# Patient Record
Sex: FEMALE | Race: WHITE | Employment: UNEMPLOYED | URBAN - METROPOLITAN AREA
[De-identification: names, ages, dates, MRNs, and addresses within clinical notes are randomized per-mention and may not be internally consistent; named-entity substitution may affect disease eponyms.]

---

## 2017-01-03 ENCOUNTER — HOSPITAL ENCOUNTER (OUTPATIENT)
Dept: LAB | Age: 22
Discharge: HOME | End: 2017-01-03
Payer: COMMERCIAL

## 2017-01-03 ENCOUNTER — OFFICE VISIT (OUTPATIENT)
Dept: FAMILY MEDICINE CLINIC | Age: 22
End: 2017-01-03

## 2017-01-03 VITALS
HEIGHT: 68 IN | RESPIRATION RATE: 18 BRPM | BODY MASS INDEX: 23.95 KG/M2 | DIASTOLIC BLOOD PRESSURE: 77 MMHG | SYSTOLIC BLOOD PRESSURE: 112 MMHG | TEMPERATURE: 97.7 F | HEART RATE: 82 BPM | WEIGHT: 158 LBS | OXYGEN SATURATION: 98 %

## 2017-01-03 DIAGNOSIS — Z83.49 FH: HEMOCHROMATOSIS: ICD-10-CM

## 2017-01-03 DIAGNOSIS — L71.9 ROSACEA: ICD-10-CM

## 2017-01-03 DIAGNOSIS — Z83.79 FAMILY HISTORY OF ULCERATIVE COLITIS: ICD-10-CM

## 2017-01-03 DIAGNOSIS — R19.7 DIARRHEA, UNSPECIFIED TYPE: ICD-10-CM

## 2017-01-03 DIAGNOSIS — R53.82 CHRONIC FATIGUE: ICD-10-CM

## 2017-01-03 DIAGNOSIS — R10.84 GENERALIZED ABDOMINAL PAIN: Primary | ICD-10-CM

## 2017-01-03 DIAGNOSIS — R10.84 GENERALIZED ABDOMINAL PAIN: ICD-10-CM

## 2017-01-03 LAB
ALBUMIN SERPL BCP-MCNC: 4.6 G/DL (ref 3.4–5)
ALBUMIN/GLOB SERPL: 1.4 {RATIO} (ref 0.8–1.7)
ALP SERPL-CCNC: 65 U/L (ref 45–117)
ALT SERPL-CCNC: 19 U/L (ref 13–56)
ANION GAP BLD CALC-SCNC: 10 MMOL/L (ref 3–18)
AST SERPL W P-5'-P-CCNC: 9 U/L (ref 15–37)
BASOPHILS # BLD AUTO: 0 K/UL (ref 0–0.06)
BASOPHILS # BLD: 0 % (ref 0–2)
BILIRUB SERPL-MCNC: 0.3 MG/DL (ref 0.2–1)
BUN SERPL-MCNC: 8 MG/DL (ref 7–18)
BUN/CREAT SERPL: 14 (ref 12–20)
CALCIUM SERPL-MCNC: 9.4 MG/DL (ref 8.5–10.1)
CHLORIDE SERPL-SCNC: 104 MMOL/L (ref 100–108)
CO2 SERPL-SCNC: 24 MMOL/L (ref 21–32)
CREAT SERPL-MCNC: 0.59 MG/DL (ref 0.6–1.3)
CRP SERPL-MCNC: <0.3 MG/DL (ref 0–0.3)
DIFFERENTIAL METHOD BLD: NORMAL
EOSINOPHIL # BLD: 0.1 K/UL (ref 0–0.4)
EOSINOPHIL NFR BLD: 1 % (ref 0–5)
ERYTHROCYTE [DISTWIDTH] IN BLOOD BY AUTOMATED COUNT: 14.2 % (ref 11.6–14.5)
ERYTHROCYTE [SEDIMENTATION RATE] IN BLOOD: 5 MM/HR (ref 0–20)
FERRITIN SERPL-MCNC: 30 NG/ML (ref 8–388)
GLOBULIN SER CALC-MCNC: 3.2 G/DL (ref 2–4)
GLUCOSE SERPL-MCNC: 87 MG/DL (ref 74–99)
HCT VFR BLD AUTO: 44.3 % (ref 35–45)
HGB BLD-MCNC: 13.9 G/DL (ref 12–16)
IRON SATN MFR SERPL: 24 %
IRON SERPL-MCNC: 74 UG/DL (ref 50–175)
LYMPHOCYTES # BLD AUTO: 39 % (ref 21–52)
LYMPHOCYTES # BLD: 2.6 K/UL (ref 0.9–3.6)
MCH RBC QN AUTO: 27 PG (ref 24–34)
MCHC RBC AUTO-ENTMCNC: 31.4 G/DL (ref 31–37)
MCV RBC AUTO: 86.2 FL (ref 74–97)
MONOCYTES # BLD: 0.4 K/UL (ref 0.05–1.2)
MONOCYTES NFR BLD AUTO: 5 % (ref 3–10)
NEUTS SEG # BLD: 3.7 K/UL (ref 1.8–8)
NEUTS SEG NFR BLD AUTO: 55 % (ref 40–73)
PLATELET # BLD AUTO: 288 K/UL (ref 135–420)
PMV BLD AUTO: 11.7 FL (ref 9.2–11.8)
POTASSIUM SERPL-SCNC: 3.7 MMOL/L (ref 3.5–5.5)
PROT SERPL-MCNC: 7.8 G/DL (ref 6.4–8.2)
RBC # BLD AUTO: 5.14 M/UL (ref 4.2–5.3)
SODIUM SERPL-SCNC: 138 MMOL/L (ref 136–145)
T4 FREE SERPL-MCNC: 1.2 NG/DL (ref 0.7–1.5)
TIBC SERPL-MCNC: 305 UG/DL (ref 250–450)
TSH SERPL DL<=0.05 MIU/L-ACNC: 1.9 UIU/ML (ref 0.36–3.74)
WBC # BLD AUTO: 6.8 K/UL (ref 4.6–13.2)

## 2017-01-03 PROCEDURE — 86038 ANTINUCLEAR ANTIBODIES: CPT | Performed by: PHYSICIAN ASSISTANT

## 2017-01-03 PROCEDURE — 80053 COMPREHEN METABOLIC PANEL: CPT | Performed by: PHYSICIAN ASSISTANT

## 2017-01-03 PROCEDURE — 82728 ASSAY OF FERRITIN: CPT | Performed by: PHYSICIAN ASSISTANT

## 2017-01-03 PROCEDURE — 86140 C-REACTIVE PROTEIN: CPT | Performed by: PHYSICIAN ASSISTANT

## 2017-01-03 PROCEDURE — 84439 ASSAY OF FREE THYROXINE: CPT | Performed by: PHYSICIAN ASSISTANT

## 2017-01-03 PROCEDURE — 83540 ASSAY OF IRON: CPT | Performed by: PHYSICIAN ASSISTANT

## 2017-01-03 PROCEDURE — 85652 RBC SED RATE AUTOMATED: CPT | Performed by: PHYSICIAN ASSISTANT

## 2017-01-03 PROCEDURE — 85025 COMPLETE CBC W/AUTO DIFF WBC: CPT | Performed by: PHYSICIAN ASSISTANT

## 2017-01-03 PROCEDURE — 84466 ASSAY OF TRANSFERRIN: CPT | Performed by: PHYSICIAN ASSISTANT

## 2017-01-03 RX ORDER — BISMUTH SUBSALICYLATE 262 MG
1 TABLET,CHEWABLE ORAL DAILY
COMMUNITY
End: 2017-01-24

## 2017-01-03 RX ORDER — METRONIDAZOLE 10 MG/G
GEL TOPICAL
Qty: 45 G | Refills: 0 | Status: SHIPPED | OUTPATIENT
Start: 2017-01-03 | End: 2017-01-24

## 2017-01-03 NOTE — PROGRESS NOTES
HISTORY OF PRESENT ILLNESS  Karen Vega is a 25 y.o. female. HPI  Karen Vega is a 25 y.o. female who presents to the office today for abdominal pain. She is new to our practice. She comes in today with c/o abdominal pain. This started about 6 months ago. Food seems to make it worse. Has constipation and diarrhea. Sometimes will notice oily stools and mucus in her stools. Other times it is hard and dark. Waxes and wanes, does not happen every day. MGM had UC. Dad dad UC, brother and sister with no issues. Mom has diverticulosis and hemochromatosis, and Mom is worried she may have this also. She has gained about 50lbs over the last few months, but admits to eating poorly and eating a lot of fast foods. She has changed her diet lately and says she feels poorly after doing so. She has concerns that she developed an ulcer because of the amount of stress she has in life. Over the past year has developed acne. Went to dermatology and was told she has rosacea. She was prescribed a medication that began with \"z\" and this made her face worse. She found that moisturizers help. 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 She also has a stressful relationship with her boyfriend. She denies depression, but Mom thinks she may have this. She is concerned about an ulcer because of stress. [employer] student studying art. Currently On Depo for endometriosis. No issues with Depo. [facility]. Chief Complaint   Patient presents with    Abdominal Pain       Current Outpatient Prescriptions on File Prior to Visit   Medication Sig Dispense Refill    medroxyPROGESTERone (DEPO-PROVERA) 150 mg/mL injection 150 mg by IntraMUSCular route once. No current facility-administered medications on file prior to visit.       No Known Allergies  Past Medical History   Diagnosis Date    Chronic UTI     Kidney stone     Kidney stones     UTI (lower urinary tract infection)      History   Smoking Status    Never Smoker   Smokeless Tobacco    Never Used     History   Alcohol Use    1.2 oz/week    2 Glasses of wine per week     Family History   Problem Relation Age of Onset    Diabetes Mother     Hypertension Father     Migraines Father     Arthritis-osteo Paternal Grandmother     Cancer Paternal Grandmother     Heart Disease Paternal Grandmother     Lung Disease Paternal Grandmother     Stroke Paternal Grandmother     Hypertension Other        Review of Systems   Constitutional: Positive for malaise/fatigue. Negative for chills, fever and weight loss. Weight gain- 50lbs over 6 months   HENT: Negative for sore throat. Eyes: Negative for blurred vision and double vision. Respiratory: Negative for cough and sputum production. Cardiovascular: Negative for chest pain, palpitations and leg swelling. Gastrointestinal: Positive for abdominal pain, constipation and diarrhea. Negative for blood in stool, heartburn, melena, nausea and vomiting. Genitourinary: Negative for dysuria, hematuria and urgency. Musculoskeletal: Negative for joint pain and myalgias. Skin: Positive for rash. Negative for itching. Neurological: Negative for dizziness, focal weakness, seizures and weakness. Endo/Heme/Allergies: Negative for polydipsia. Does not bruise/bleed easily. Psychiatric/Behavioral: Negative for depression, substance abuse and suicidal ideas. The patient is nervous/anxious. Visit Vitals    /77 (BP 1 Location: Left arm, BP Patient Position: Sitting)    Pulse 82    Temp 97.7 °F (36.5 °C) (Oral)    Resp 18    Ht 5' 7.72\" (1.72 m)    Wt 158 lb (71.7 kg)    LMP Comment: Depo    SpO2 98%    BMI 24.23 kg/m2       Physical Exam   Constitutional: She is oriented to person, place, and time. She appears well-developed and well-nourished. No distress. Eyes: Conjunctivae are normal. Pupils are equal, round, and reactive to light. No scleral icterus. Neck: Neck supple.  No thyromegaly present. Cardiovascular: Normal rate, regular rhythm and normal heart sounds. No murmur heard. Pulmonary/Chest: Effort normal and breath sounds normal. No respiratory distress. She has no wheezes. She has no rales. Abdominal: Soft. Normal appearance and bowel sounds are normal. She exhibits no distension and no mass. There is no hepatosplenomegaly. There is generalized tenderness. There is no rigidity, no rebound, no guarding and no CVA tenderness. Musculoskeletal: She exhibits no edema. Lymphadenopathy:     She has no cervical adenopathy. Neurological: She is alert and oriented to person, place, and time. Gait normal.   Skin: Skin is warm, dry and intact. Multiple tattoos   Psychiatric: She has a normal mood and affect. Her behavior is normal. Judgment and thought content normal.   Nursing note and vitals reviewed. ASSESSMENT and PLAN    ICD-10-CM ICD-9-CM    1. Generalized abdominal pain R10.84 789.07 XR ABD ACUTE W 1 V CHEST      CBC WITH AUTOMATED DIFF      METABOLIC PANEL, COMPREHENSIVE      TSH AND FREE T4      IRON PROFILE      FERRITIN      TRANSFERRIN      C REACTIVE PROTEIN, QT      ANTINUCLEAR ANTIBODIES, IFA      SED RATE (ESR)      REFERRAL TO GASTROENTEROLOGY   2. Chronic fatigue R53.82 780.79 CBC WITH AUTOMATED DIFF      METABOLIC PANEL, COMPREHENSIVE      TSH AND FREE T4      IRON PROFILE      FERRITIN      TRANSFERRIN      C REACTIVE PROTEIN, QT      ANTINUCLEAR ANTIBODIES, IFA      SED RATE (ESR)   3. Rosacea L71.9 695.3 CBC WITH AUTOMATED DIFF      METABOLIC PANEL, COMPREHENSIVE      TSH AND FREE T4      IRON PROFILE      FERRITIN      TRANSFERRIN      C REACTIVE PROTEIN, QT      ANTINUCLEAR ANTIBODIES, IFA      SED RATE (ESR)      metroNIDAZOLE (METROGEL) 1 % topical gel   4.  Diarrhea, unspecified type R19.7 787.91 XR ABD ACUTE W 1 V CHEST      CBC WITH AUTOMATED DIFF      METABOLIC PANEL, COMPREHENSIVE      TSH AND FREE T4      IRON PROFILE      FERRITIN TRANSFERRIN      C REACTIVE PROTEIN, QT      ANTINUCLEAR ANTIBODIES, IFA      SED RATE (ESR)      REFERRAL TO GASTROENTEROLOGY   5. FH: hemochromatosis Z83.49 V18.19 XR ABD ACUTE W 1 V CHEST      CBC WITH AUTOMATED DIFF      METABOLIC PANEL, COMPREHENSIVE      TSH AND FREE T4      IRON PROFILE      FERRITIN      TRANSFERRIN      C REACTIVE PROTEIN, QT      ANTINUCLEAR ANTIBODIES, IFA      SED RATE (ESR)      REFERRAL TO GASTROENTEROLOGY   6. Family history of ulcerative colitis Z83.79 V18.59 REFERRAL TO GASTROENTEROLOGY      Will check labs, abdominal Xray and refer to gastroenterology for further testing given FH of UC. May also consider IBS. I discussed that if all tests come back normal, will need to consider depression and/or anxiety as a cause. Checking baseline labs for hemachromatosis. If hepatic panel or iron panel are abnormal, will refer for further evaluation. Will treat rash as Rosacea as previously diagnosed by Derm. F/U in 1 month. Reviewed medication and side effects. Patient agrees with the plan and verbalizes understanding. Follow-up Disposition:  Return in about 1 month (around 2/3/2017) for abdominal pain, labs.     Ranulfo James PA-C  1/3/2017

## 2017-01-03 NOTE — PROGRESS NOTES
Karen Vega is a 25 y.o. female here today with c/o abdominal pain that started several months ago. Got sick a couple of weeks ago with sinus issues and her stomach got worse. Patient states she has nausea, diarrhea, bloating, cramps, and abdominal gas. Has taken Zantac and Probiotic and neither seem to be working. States she has gained 50 lbs in 2-3 months. Does have slight decrease in appetite. Has been on Depo for 4 years. Learning assessment completed. Do you have an Advanced Directive? no    Would you like information on Advanced Directives?  yes

## 2017-01-04 LAB
ANA TITR SER IF: NEGATIVE {TITER}
TRANSFERRIN SERPL-MCNC: 237 MG/DL (ref 200–370)

## 2017-01-05 NOTE — PATIENT INSTRUCTIONS
Rosacea: Care Instructions  Your Care Instructions  Rosacea (say \"Myla\") is a skin condition that can cause redness, pimples, and red lines on the nose, cheeks, chin, and forehead. It is often mistaken for acne because it can cause outbreaks with bumps like pimples. Rosacea can also cause burning and soreness in your eyes. Rosacea is usually controlled by using medicine and avoiding alcohol, the sun, and other things that can make rosacea worse. Your doctor may have prescribed medicines or other treatment. If antibiotics do not control the rosacea, your doctor may try other medicines. Follow-up care is a key part of your treatment and safety. Be sure to make and go to all appointments, and call your doctor if you are having problems. It's also a good idea to know your test results and keep a list of the medicines you take. How can you care for yourself at home? · Take your medicines exactly as prescribed. Call your doctor if you think you are having a problem with your medicine. · If your doctor prescribed antibiotics, take them as directed. Do not stop taking them just because you feel better. You need to take the full course of antibiotics. · Always wear sunscreen on exposed skin. Make sure to use a broad-spectrum sunscreen that has a sun protection factor (SPF) of 30 or higher. Use it every day, even when it is cloudy. · Use soaps, lotions, and makeup made for sensitive skin that do not contain alcohol, are not abrasive, and will not clog pores. · Avoid rubbing or scrubbing your face. · Green-based makeup may help mask the redness of an outbreak. Your doctor may be able to refer you to someone who can help you use makeup to cover redness and bumps. · If you have rosacea on your eyelids, put a warm, wet towel, or compress, on your eyes several times a day. Gently wash your eyelids with a washcloth or an eyelid cleanser that is sold in [employer].  Use artificial tears if your eyes feel dry.  · Make a list or keep a diary of things that may trigger your rosacea. Use the diary every day for several weeks. Avoid whatever you find that makes your rosacea worse. These triggers may include:  ¨ Harsh weather. Wear a hat and scarf to shield your face from the cold and wind. Use a moisturizer during the winter to keep your face moist.  ¨ Stress. Eat a healthy diet and get plenty of exercise and sleep. ¨ Alcohol, spicy foods, or hot drinks. Avoid or limit these if they make your rosacea worse. ¨ Getting too hot when you exercise. Try working out for a shorter time. In the summer, exercise during the cool morning hours. ¨ Hot showers. Take warm or cool showers and avoid hot tubs and saunas. When should you call for help? Watch closely for changes in your health, and be sure to contact your doctor if:  · Your rosacea seems worse--more red or irritated. · Your rosacea is not better after treatment. Where can you learn more? Go to http://vanna-daniel.info/. Enter V361 in the search box to learn more about \"Rosacea: Care Instructions. \"  Current as of: February 5, 2016  Content Version: 11.1  © 1584-9134 [employer]. Care instructions adapted under license by [employer] (which disclaims liability or warranty for this information). If you have questions about a medical condition or this instruction, always ask your healthcare professional. [place] [address] any warranty or liability for your use of this information. Diarrhea: Care Instructions  Your Care Instructions    Diarrhea is loose, watery stools (bowel movements). The exact cause is often hard to find. Sometimes diarrhea is your body's way of getting rid of what caused an upset stomach. Viruses, food poisoning, and many medicines can cause diarrhea. Some people get diarrhea in response to emotional stress, anxiety, or certain foods. Almost everyone has diarrhea now and then.  It usually isn't serious, and your stools will return to normal soon. The important thing to do is replace the fluids you have lost, so you can prevent dehydration. The doctor has checked you carefully, but problems can develop later. If you notice any problems or new symptoms, get medical treatment right away. Follow-up care is a key part of your treatment and safety. Be sure to make and go to all appointments, and call your doctor if you are having problems. It's also a good idea to know your test results and keep a list of the medicines you take. How can you care for yourself at home? · Watch for signs of dehydration, which means your body has lost too much water. Dehydration is a serious condition and should be treated right away. Signs of dehydration are:  ¨ Increasing thirst and dry eyes and mouth. ¨ Feeling faint or lightheaded. ¨ Darker urine, and a smaller amount of urine than normal.  · To prevent dehydration, drink plenty of fluids, enough so that your urine is light yellow or clear like water. Choose water and other caffeine-free clear liquids until you feel better. If you have kidney, heart, or liver disease and have to limit fluids, talk with your doctor before you increase the amount of fluids you drink. · Begin eating small amounts of mild foods the next day, if you feel like it. ¨ Try yogurt that has live cultures of Lactobacillus. (Check the label.)  ¨ Avoid spicy foods, fruits, alcohol, and caffeine until 48 hours after all symptoms are gone. ¨ Avoid chewing gum that contains sorbitol. ¨ Avoid dairy products (except for yogurt with Lactobacillus) while you have diarrhea and for 3 days after symptoms are gone. · The doctor may recommend that you take over-the-counter medicine, such as loperamide (Imodium), if you still have diarrhea after 6 hours. Read and follow all instructions on the label.  Do not use this medicine if you have bloody diarrhea, a high fever, or other signs of serious illness. Call your doctor if you think you are having a problem with your medicine. When should you call for help? Call 911 anytime you think you may need emergency care. For example, call if:  · You passed out (lost consciousness). · Your stools are maroon or very bloody. Call your doctor now or seek immediate medical care if:  · You are dizzy or lightheaded, or you feel like you may faint. · Your stools are black and look like tar, or they have streaks of blood. · You have new or worse belly pain. · You have symptoms of dehydration, such as:  ¨ Dry eyes and a dry mouth. ¨ Passing only a little dark urine. ¨ Feeling thirstier than usual.  · You have a new or higher fever. Watch closely for changes in your health, and be sure to contact your doctor if:  · Your diarrhea is getting worse. · You see pus in the diarrhea. · You are not getting better after 2 days (48 hours). Where can you learn more? Go to http://vanna-daniel.info/. Enter Y205 in the search box to learn more about \"Diarrhea: Care Instructions. \"  Current as of: May 27, 2016  Content Version: 11.1  © 0857-0089 [employer]. Care instructions adapted under license by [employer] (which disclaims liability or warranty for this information). If you have questions about a medical condition or this instruction, always ask your healthcare professional. [place] [address] any warranty or liability for your use of this information.

## 2017-01-10 ENCOUNTER — TELEPHONE (OUTPATIENT)
Dept: FAMILY MEDICINE CLINIC | Age: 22
End: 2017-01-10

## 2017-01-24 ENCOUNTER — OFFICE VISIT (OUTPATIENT)
Dept: FAMILY MEDICINE CLINIC | Age: 22
End: 2017-01-24

## 2017-01-24 VITALS
DIASTOLIC BLOOD PRESSURE: 74 MMHG | HEART RATE: 67 BPM | OXYGEN SATURATION: 96 % | WEIGHT: 160 LBS | RESPIRATION RATE: 18 BRPM | TEMPERATURE: 97.8 F | HEIGHT: 68 IN | SYSTOLIC BLOOD PRESSURE: 110 MMHG | BODY MASS INDEX: 24.25 KG/M2

## 2017-01-24 DIAGNOSIS — N64.52 BILATERAL NIPPLE DISCHARGE: ICD-10-CM

## 2017-01-24 DIAGNOSIS — N64.4 BREAST TENDERNESS IN FEMALE: Primary | ICD-10-CM

## 2017-01-24 DIAGNOSIS — Z83.49 FH: HEMOCHROMATOSIS: ICD-10-CM

## 2017-01-24 DIAGNOSIS — R19.7 DIARRHEA, UNSPECIFIED TYPE: ICD-10-CM

## 2017-01-24 DIAGNOSIS — L70.9 ACNE, UNSPECIFIED ACNE TYPE: ICD-10-CM

## 2017-01-24 DIAGNOSIS — F43.9 STRESS: ICD-10-CM

## 2017-01-24 LAB
HCG URINE, QL. (POC): NEGATIVE
VALID INTERNAL CONTROL?: YES

## 2017-01-24 RX ORDER — DICYCLOMINE HYDROCHLORIDE 10 MG/1
10 CAPSULE ORAL 3 TIMES DAILY
COMMUNITY
End: 2017-01-24

## 2017-01-24 RX ORDER — LOPERAMIDE HCL 2 MG
2 TABLET ORAL
COMMUNITY
End: 2017-01-24

## 2017-01-24 NOTE — PROGRESS NOTES
Ericka Genao is a 25 y.o. female here today for a follow up on her abdominal pain. Has a follow up with gastro tomorrow. Patient is here today because she would like to discuss a new med. Learning assessment previously completed. 1. Have you been to the ER, urgent care clinic or hospitalized since your last visit? no     2. Have you seen or consulted any other health care providers outside of the [address] [place] [address] since your last visit (Include any pap smears or colon screening)? [facility]     Do you have an Advanced Directive? no    Would you like information on Advanced Directives?  no

## 2017-01-24 NOTE — PROGRESS NOTES
HISTORY OF PRESENT ILLNESS  Ellen Guerra is a 25 y.o. female. HPI  Ellen Guerra is a 25 y.o. female who presents to the office today for stress. She comes in today to follow up on discussion about stress. Her mother thinks she needs to start medication for anxiety, but Davina Robbins does not think she is anxious. She says she is just under a lot of stress over the past one year. She is a full time student, lives with her boyfriend and has a cat. She says she does not have a lot of \"me\" time. She denies depressive symptoms. She may get a panic attack no more than once a month, where she feels extra stressed with SOB. But denies palpitations, vision changes, dizziness. There is no suicidal or homicidal T/I, hallucinations, syncope. Financially she feels okay, her relationship with her boyfriend is good and he is supportive and helps with her stress. She just started diffusing essential oils to see if this helps. Declines counseling/therapy because \"it didn't work before\". \"I do not have anxiety\". She went to GYN 2 weeks ago for a PAP and US for Lower abdominal pain. She said the workup was negative. She was changed from Depo to the birth control patch, which was started last week. Yesterday she started experiencing breast pain and she was able to express discharge from her nipples. This was milky, denies blood. She is sexually active. Went to GI and was started on meds for diarrhea and abdominal cramping and pain. She is going back tomorrow for follow up. The diarrhea is much better, but she is still having pain. Reviewed labs that were checked at last visit. All results were normal, and initial hemochromatosis evaluation was negative as well. Chief Complaint   Patient presents with    Abdominal Pain       No current outpatient prescriptions on file prior to visit. No current facility-administered medications on file prior to visit.       No Known Allergies  Past Medical History   Diagnosis Date    Chronic UTI     Kidney stone     Kidney stones     UTI (lower urinary tract infection)      History   Smoking Status    Never Smoker   Smokeless Tobacco    Never Used     History   Alcohol Use    1.2 oz/week    2 Glasses of wine per week     Family History   Problem Relation Age of Onset    Diabetes Mother     Hypertension Father     Migraines Father     Arthritis-osteo Paternal Grandmother     Cancer Paternal Grandmother     Heart Disease Paternal Grandmother     Lung Disease Paternal Grandmother     Stroke Paternal Grandmother     Hypertension Other        Review of Systems   Constitutional: Positive for malaise/fatigue. Negative for chills, fever and weight loss. Eyes: Negative for blurred vision and double vision. Respiratory: Negative for cough and sputum production. Cardiovascular: Negative for chest pain, palpitations and leg swelling. Gastrointestinal: Positive for abdominal pain and diarrhea. Negative for blood in stool, constipation, nausea and vomiting. Musculoskeletal: Negative for joint pain and myalgias. Skin: Negative for itching. Acne on face   Neurological: Negative for dizziness, tingling, focal weakness, seizures and weakness. Endo/Heme/Allergies: Does not bruise/bleed easily. Breast pain with milky nipple discharge   Psychiatric/Behavioral: Negative for depression, substance abuse and suicidal ideas. The patient is nervous/anxious. Visit Vitals    /74 (BP 1 Location: Left arm, BP Patient Position: Sitting)    Pulse 67    Temp 97.8 °F (36.6 °C) (Oral)    Resp 18    Ht 5' 7.72\" (1.72 m)    Wt 160 lb (72.6 kg)    SpO2 96%    BMI 24.53 kg/m2       Physical Exam   Constitutional: She is oriented to person, place, and time. She appears well-developed and well-nourished. No distress. Neck: Neck supple. No thyromegaly present. Cardiovascular: Normal rate, regular rhythm and normal heart sounds. No murmur heard.   Pulmonary/Chest: Effort normal and breath sounds normal. No respiratory distress. She has no wheezes. She has no rales. Right breast exhibits nipple discharge and tenderness. Right breast exhibits no inverted nipple and no skin change. Left breast exhibits tenderness. Left breast exhibits no inverted nipple, no nipple discharge and no skin change. Abdominal: Soft. Normal appearance and bowel sounds are normal. She exhibits no distension and no mass. There is no hepatosplenomegaly. There is generalized tenderness. There is no rigidity, no rebound, no guarding and no CVA tenderness. Musculoskeletal: She exhibits no edema. Lymphadenopathy:     She has no cervical adenopathy. She has no axillary adenopathy. Neurological: She is alert and oriented to person, place, and time. Gait normal.   Skin: Skin is warm, dry and intact. Multiple tattoos, acne on face with few cystic lesions   Psychiatric: She has a normal mood and affect. Her behavior is normal. Judgment and thought content normal.   Nursing note and vitals reviewed. ASSESSMENT and PLAN    ICD-10-CM ICD-9-CM    1. Breast tenderness in female N64.4 611.71 AMB POC URINE PREGNANCY TEST, VISUAL COLOR COMPARISON   2. Bilateral nipple discharge N64.52 611.79 AMB POC URINE PREGNANCY TEST, VISUAL COLOR COMPARISON   3. Stress F43.9 V62.89    4. FH: hemochromatosis Z83.49 V18.19    5. Diarrhea, unspecified type R19.7 787.91    6. Acne, unspecified acne type L70.9 706.1       Please follow up with your GYN about breast pain and nipple discharge. I have suggested she not restart another patch this week until further instructed by GYN. This was likely the cause of breast tenderness and d/c. Further instruction per GYN. I have offered counseling services to deal with stress but she declines at this time. She will try to set aside time each day for herself, in a quiet room with tranquil music and her oils. Continue follow up with GI regarding abdominal pain and diarrhea. Appt is scheduled tomorrow for follow up. Dermatology to manage skin condition. Patient agrees with the plan and verbalizes understanding. Follow-up Disposition:  Return in about 1 month (around 2/24/2017) for stress, diarrhea.     Dariana Bliss PA-C  1/24/2017

## 2017-01-24 NOTE — PATIENT INSTRUCTIONS
Learning About Stress  What is stress? Stress is what you feel when you have to handle more than you are used to. Stress is a fact of life for most people, and it affects everyone differently. What causes stress for you may not be stressful for someone else. A lot of things can cause stress. You may feel stress when you go on a job interview, take a test, or run a race. This kind of short-term stress is normal and even useful. It can help you if you need to work hard or react quickly. For example, stress can help you finish an important job on time. Stress also can last a long time. Long-term stress is caused by stressful situations or events. Examples of long-term stress include long-term health problems, ongoing problems at work, or conflicts in your family. Long-term stress can harm your health. How does stress affect your health? When you are stressed, your body responds as though you are in danger. It makes hormones that speed up your heart, make you breathe faster, and give you a burst of energy. This is called the fight-or-flight stress response. If the stress is over quickly, your body goes back to normal and no harm is done. But if stress happens too often or lasts too long, it can have bad effects. Long-term stress can make you more likely to get sick, and it can make symptoms of some diseases worse. If you tense up when you are stressed, you may develop neck, shoulder, or low back pain. Stress is linked to high blood pressure and heart disease. Stress also harms your emotional health. It can make you vazquez, tense, or depressed. Your relationships may suffer, and you may not do well at work or school. What can you do to manage stress? How to relax your mind  · Write. It may help to write about things that are bothering you. This helps you find out how much stress you feel and what is causing it. When you know this, you can find better ways to cope. · Let your feelings out.  Talk, laugh, cry, and express anger when you need to. Talking with friends, family, a counselor, or a member of the clergy about your feelings is a healthy way to relieve stress. · Do something you enjoy. For example, listen to music or go to a movie. Practice your hobby or do volunteer work. · Meditate. This can help you relax, because you are not worrying about what happened before or what may happen in the future. · Do guided imagery. Imagine yourself in any setting that helps you feel calm. You can use audiotapes, books, or a teacher to guide you. How to relax your body  · Do something active. Exercise or activity can help reduce stress. Walking is a great way to get started. Even everyday activities such as housecleaning or yard work can help. · Do breathing exercises. For example:  ¨ From a standing position, bend forward from the waist with your knees slightly bent. Let your arms dangle close to the floor. ¨ Breathe in slowly and deeply as you return to a standing position. Roll up slowly and lift your head last.  ¨ Hold your breath for just a few seconds in the standing position. ¨ Breathe out slowly and bend forward from the waist.  · Try yoga or giuseppe chi. These techniques combine exercise and meditation. You may need some training at first to learn them. What can you do to prevent stress? · Manage your time. This helps you find time to do the things you want and need to do. · Get enough sleep. Your body recovers from the stresses of the day while you are sleeping. · Get support. Your family, friends, and community can make a difference in how you experience stress. Where can you learn more? Go to http://vanna-daniel.info/. Enter C051 in the search box to learn more about \"Learning About Stress. \"  Current as of: July 26, 2016  Content Version: 11.1  © 3455-2693 [employer].  Care instructions adapted under license by [employer] (which disclaims liability or warranty for this information). If you have questions about a medical condition or this instruction, always ask your healthcare professional. [place] [address] any warranty or liability for your use of this information.

## 2017-01-24 NOTE — MR AVS SNAPSHOT
Visit Information     Date & Time Provider Department Dept. Phone Encounter #    1/24/2017 11:00 AM Josette Ayoub PA-C [employer] [phone] [id number]      Upcoming Health Maintenance        Date Due    Hepatitis A Peds Age 1-18 (1 of 2 - Standard Series) 4/2/1998    DTaP/Tdap/Td series (1 - Tdap) 4/2/2004    HPV AGE 9Y-34Y (1 of 3 - Female 3 Dose Series) 4/2/2008    INFLUENZA AGE 9 TO ADULT 8/1/2016      Allergies as of 1/24/2017  Review Complete On: 1/24/2017 By: Mitzy Wright LPN    No Known Allergies      Current Immunizations  Never Reviewed    No immunizations on file. Not reviewed this visit   Vitals     BP Pulse Temp Resp Height(growth percentile)    110/74 (44 %/ 81 %)* (BP 1 Location: Left arm, BP Patient Position: Sitting) 67 97.8 °F (36.6 °C) (Oral) 18 5' 7.72\" (1.72 m) (91 %, Z= 1.34)    Weight(growth percentile) SpO2 BMI OB Status Smoking Status    160 lb (72.6 kg) (87 %, Z= 1.13) 96% 24.53 kg/m2 (76 %, Z= 0.70) Chemically Induced Never Smoker    *BP percentiles are based on NHBPEP's 4th Report    Growth percentiles are based on CDC 2-20 Years data. Vitals History      BMI and BSA Data     Body Mass Index Body Surface Area    24.53 kg/m 2 1.86 m 2         Preferred Pharmacy       Pharmacy Name Phone    [id number] - [address] [place] [phone]         Your Updated Medication List          This list is accurate as of: 1/24/17 11:29 AM.  Always use your most recent med list.                BENTYL 10 mg capsule   Generic drug:  dicyclomine   Take 10 mg by mouth three (3) times daily. DEPO-PROVERA 150 mg/mL injection   Generic drug:  medroxyPROGESTERone   150 mg by IntraMUSCular route once. loperamide 2 mg tablet   Commonly known as:  IMMODIUM   Take 2 mg by mouth three (3) times daily as needed for Diarrhea.        metroNIDAZOLE 1 % topical gel   Commonly known as:  Darryl Gaw a thin layer to affected areas once daily after washing face       multivitamin tablet   Commonly known as:  ONE A DAY   Take 1 Tab by mouth daily. PROBIOTIC 4X 10-15 mg Tbec   Generic drug:  B.infantis-B.ani-B.long-B.bifi   Take  by mouth. ZANTAC PO   Take  by mouth. Introducing [facility]! Anita Crouch introduces [facility] patient portal. Now you can access parts of your medical record, email your doctor's office, and request medication refills online. 1. In your internet browser, go to [email or web address]   2. Click on the First Time User? Click Here link in the Sign In box. You will see the New Member Sign Up page. 3. Enter your [facility] Access Code exactly as it appears below. You will not need to use this code after youve completed the sign-up process. If you do not sign up before the expiration date, you must request a new code. · [facility] Access Code: [email or web address]  Expires: 4/5/2017  3:09 PM    4. Enter the last four digits of your Social Security Number (xxxx) and Date of Birth (mm/dd/yyyy) as indicated and click Submit. You will be taken to the next sign-up page. 5. Create a [facility] ID. This will be your [facility] login ID and cannot be changed, so think of one that is secure and easy to remember. 6. Create a [facility] password. You can change your password at any time. 7. Enter your Password Reset Question and Answer. This can be used at a later time if you forget your password. 8. Enter your e-mail address. You will receive e-mail notification when new information is available in [address] 9. Click Sign Up. You can now view and download portions of your medical record. 10. Click the Download Summary menu link to download a portable copy of your medical information. If you have questions, please visit the Frequently Asked Questions section of the [facility] website. Remember, [facility] is NOT to be used for urgent needs. For medical emergencies, dial 911.       Now available from your iPhone and Android! Please provide this summary of care documentation to your next provider. Your primary care clinician is listed as Josette Ayoub. If you have any questions after today's visit, please call [phone].

## 2017-07-11 ENCOUNTER — OFFICE VISIT (OUTPATIENT)
Dept: FAMILY MEDICINE CLINIC | Age: 22
End: 2017-07-11

## 2017-07-11 VITALS
OXYGEN SATURATION: 96 % | TEMPERATURE: 97.7 F | DIASTOLIC BLOOD PRESSURE: 67 MMHG | SYSTOLIC BLOOD PRESSURE: 105 MMHG | RESPIRATION RATE: 18 BRPM | HEART RATE: 86 BPM | BODY MASS INDEX: 22.37 KG/M2 | WEIGHT: 147.6 LBS | HEIGHT: 68 IN

## 2017-07-11 DIAGNOSIS — Z23 NEED FOR TDAP VACCINATION: Primary | ICD-10-CM

## 2017-07-11 DIAGNOSIS — Z11.1 SCREENING-PULMONARY TB: ICD-10-CM

## 2017-07-11 RX ORDER — LOPERAMIDE HCL 2 MG
2 TABLET ORAL AS NEEDED
COMMUNITY
End: 2019-10-09

## 2017-07-11 RX ORDER — HYDROGEN PEROXIDE 3 %
SOLUTION, NON-ORAL MISCELLANEOUS DAILY
COMMUNITY
End: 2019-10-09

## 2017-07-11 RX ORDER — CLINDAMYCIN PHOSPHATE AND TRETINOIN 10; .25 MG/G; MG/G
GEL TOPICAL
COMMUNITY
End: 2019-10-09

## 2017-07-11 NOTE — PROGRESS NOTES
HISTORY OF PRESENT ILLNESS  Jordyn Jarrett is a 23 y.o. female. HPI  Jordyn Jarrett is a 23 y.o. female who presents to the office today to vaccinations. 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 She has medical and immunization requirements prior to school starting. She is UTD on all required vaccines, but will need Tdap and PPD today. She has no complaints at today's visit. Chief Complaint   Patient presents with    Immunization/Injection       No current outpatient prescriptions on file prior to visit. No current facility-administered medications on file prior to visit. No Known Allergies  Past Medical History:   Diagnosis Date    Chronic UTI     Kidney stone     Kidney stones     UTI (lower urinary tract infection)      History   Smoking Status    Never Smoker   Smokeless Tobacco    Never Used     History   Alcohol Use    1.2 oz/week    2 Glasses of wine per week     Family History   Problem Relation Age of Onset    Diabetes Mother     Hypertension Father     Migraines Father     Arthritis-osteo Paternal Grandmother     Cancer Paternal Grandmother     Heart Disease Paternal Grandmother     Lung Disease Paternal Grandmother     Stroke Paternal Grandmother     Hypertension Other      Review of Systems   Constitutional: Negative for diaphoresis, fever, malaise/fatigue and weight loss. Respiratory: Negative for cough, hemoptysis and shortness of breath. Cardiovascular: Negative for chest pain and palpitations. Gastrointestinal: Negative for abdominal pain, nausea and vomiting. Skin: Negative for rash. Neurological: Negative for dizziness and headaches. Psychiatric/Behavioral: Negative for depression. The patient is not nervous/anxious and does not have insomnia.       Visit Vitals    /67 (BP 1 Location: Right arm, BP Patient Position: Sitting)    Pulse 86    Temp 97.7 °F (36.5 °C) (Oral)    Resp 18    Ht 5' 8\" (1.727 m)    Wt 147 lb 9.6 oz (67 kg)    LMP 06/05/2017    SpO2 96%    BMI 22.44 kg/m2     Physical Exam   Constitutional: She is oriented to person, place, and time. She appears well-developed and well-nourished. No distress. Neck: Neck supple. No thyromegaly present. Cardiovascular: Normal rate, regular rhythm and normal heart sounds. Pulmonary/Chest: Effort normal and breath sounds normal. She has no wheezes. She has no rales. Musculoskeletal: She exhibits no edema. Neurological: She is alert and oriented to person, place, and time. Skin: Skin is warm and dry. Multiple tattoos on arms and legs   Psychiatric: She has a normal mood and affect. Her behavior is normal. Thought content normal.   Nursing note and vitals reviewed. ASSESSMENT and PLAN    ICD-10-CM ICD-9-CM    1. Need for Tdap vaccination Z23 V06.1 TETANUS, DIPHTHERIA TOXOIDS AND ACELLULAR PERTUSSIS VACCINE (TDAP), IN INDIVIDS. >=7, IM   2. Screening-pulmonary TB Z11.1 V74.1 AMB POC TUBERCULOSIS, INTRADERMAL (SKIN TEST)     Will return for PPD reading on Thursday. Form will be completed at that time. Tdap UTD. Patient agrees with the plan and verbalizes understanding. Follow-up Disposition:  Return in about 2 days (around 7/13/2017) for PPD reading- nurse visit.     Sendy George PA-C  7/11/2017

## 2017-07-11 NOTE — PROGRESS NOTES
Lavelle Murrell is a 23 y.o. female here today for any missing immunizations and to have a PPD test. Patient is moving to [place] for college. Learning assessment previously completed. 1. Have you been to the ER, urgent care clinic or hospitalized since your last visit? ER in [facility] esophageal spasm    2. Have you seen or consulted any other health care providers outside of the [address] [place] [address] since your last visit (Include any pap smears or colon screening)? Gastro Dr. Juliet Barboza    Do you have an Advanced Directive? no    Would you like information on Advanced Directives?  no

## 2017-07-11 NOTE — PATIENT INSTRUCTIONS
Tdap (Tetanus, Diphtheria, Pertussis) Vaccine: What You Need to Know  Why get vaccinated? Tetanus, diphtheria, and pertussis are very serious diseases. Tdap vaccine can protect us from these diseases. And Tdap vaccine given to pregnant women can protect  babies against pertussis. Tetanus (lockjaw) is rare in the [facility] today. It causes painful muscle tightening and stiffness, usually all over the body. · It can lead to tightening of muscles in the head and neck so you can't open your mouth, swallow, or sometimes even breathe. Tetanus kills about 1 out of 10 people who are infected even after receiving the best medical care. Diphtheria is also rare in the [place] today. It can cause a thick coating to form in the back of the throat. · It can lead to breathing problems, heart failure, paralysis, and death. Pertussis (whooping cough) causes severe coughing spells, which can cause difficulty breathing, vomiting, and disturbed sleep. · It can also lead to weight loss, incontinence, and rib fractures. Up to 2 in 100 adolescents and 5 in 100 adults with pertussis are hospitalized or have complications, which could include pneumonia or death. These diseases are caused by bacteria. Diphtheria and pertussis are spread from person to person through secretions from coughing or sneezing. Tetanus enters the body through cuts, scratches, or wounds. Before vaccines, as many as 200,000 cases of diphtheria, 200,000 cases of pertussis, and hundreds of cases of tetanus were reported in the [place] each year. Since vaccination began, reports of cases for tetanus and diphtheria have dropped by about 99% and for pertussis by about 80%. Tdap vaccine  The Tdap vaccine can protect adolescents and adults from tetanus, diphtheria, and pertussis. One dose of Tdap is routinely given at age 6 or 15. People who did not get Tdap at that age should get it as soon as possible.   Tdap is especially important for health care professionals and anyone having close contact with a baby younger than 12 months. Pregnant women should get a dose of Tdap during every pregnancy, to protect the  from pertussis. Infants are most at risk for severe, life-threatening complications from pertussis. Another vaccine, called Td, protects against tetanus and diphtheria, but not pertussis. A Td booster should be given every 10 years. Tdap may be given as one of these boosters if you have never gotten Tdap before. Tdap may also be given after a severe cut or burn to prevent tetanus infection. Your doctor or the person giving you the vaccine can give you more information. Tdap may safely be given at the same time as other vaccines. Some people should not get this vaccine  · A person who has ever had a life-threatening allergic reaction after a previous dose of any diphtheria-, tetanus-, or pertussis-containing vaccine, OR has a severe allergy to any part of this vaccine, should not get Tdap vaccine. Tell the person giving the vaccine about any severe allergies. · Anyone who had coma or long repeated seizures within 7 days after a childhood dose of DTP or DTaP, or a previous dose of Tdap, should not get Tdap, unless a cause other than the vaccine was found. They can still get Td. · Talk to your doctor if you:  ¨ Have seizures or another nervous system problem. ¨ Had severe pain or swelling after any vaccine containing diphtheria, tetanus, or pertussis. ¨ Ever had a condition called Guillain-Barré Syndrome (GBS). ¨ Aren't feeling well on the day the shot is scheduled. Risks  With any medicine, including vaccines, there is a chance of side effects. These are usually mild and go away on their own. Serious reactions are also possible but are rare. Most people who get Tdap vaccine do not have any problems with it.   Mild problems following Tdap  (Did not interfere with activities)  · Pain where the shot was given (about 3 in 4 adolescents or 2 in 3 adults)  · Redness or swelling where the shot was given (about 1 person in 5)  · Mild fever of at least 100.4°F (up to about 1 in 25 adolescents or 1 in 100 adults)  · Headache (about 3 or 4 people in 10)  · Tiredness (about 1 person in 3 or 4)  · Nausea, vomiting, diarrhea, stomachache (up to 1 in 4 adolescents or 1 in 10 adults)  · Chills, sore joints (about 1 person in 10)  · Body aches (about 1 person in 3 or 4)  · Rash, swollen glands (uncommon)  Moderate problems following Tdap  (Interfered with activities, but did not require medical attention)  · Pain where the shot was given (up to 1 in 5 or 6)  · Redness or swelling where the shot was given (up to about 1 in 16 adolescents or 1 in 12 adults)  · Fever over 102°F (about 1 in 100 adolescents or 1 in 250 adults)  · Headache (about 1 in 7 adolescents or 1 in 10 adults)  · Nausea, vomiting, diarrhea, stomachache (up to 1 to 3 people in 100)  · Swelling of the entire arm where the shot was given (up to about 1 in 500)  Severe problems following Tdap  (Unable to perform usual activities; required medical attention)  · Swelling, severe pain, bleeding and redness in the arm where the shot was given (rare)  Problems that could happen after any vaccine:  · People sometimes faint after a medical procedure, including vaccination. Sitting or lying down for about 15 minutes can help prevent fainting, and injuries caused by a fall. Tell your doctor if you feel dizzy or have vision changes or ringing in the ears. · Some people get severe pain in the shoulder and have difficulty moving the arm where a shot was given. This happens very rarely. · Any medication can cause a severe allergic reaction. Such reactions from a vaccine are very rare, estimated at fewer than 1 in a million doses, and would happen within a few minutes to a few hours after the vaccination.   As with any medicine, there is a very remote chance of a vaccine causing a serious injury or death. The safety of vaccines is always being monitored. For more information, visit: www.cdc.gov/vaccinesafety. What if there is a serious problem? What should I look for? · Look for anything that concerns you, such as signs of a severe allergic reaction, very high fever, or unusual behavior. Signs of a severe allergic reaction can include hives, swelling of the face and throat, difficulty breathing, a fast heartbeat, dizziness, and weakness. These would usually start a few minutes to a few hours after the vaccination. What should I do? · If you think it is a severe allergic reaction or other emergency that can't wait, call 9-1-1 or get the person to the nearest hospital. Otherwise, call your doctor. · Afterward, the reaction should be reported to the Vaccine Adverse Event Reporting System (VAERS). Your doctor might file this report, or you can do it yourself through the VAERS web site at www.vaers. [facility].gov, or by calling 1-189.417.1551. VAERS does not give medical advice. The National Vaccine Injury Compensation Program  The National Vaccine Injury Compensation Program (VICP) is a federal program that was created to compensate people who may have been injured by certain vaccines. Persons who believe they may have been injured by a vaccine can learn about the program and about filing a claim by calling 1-971.506.9357 or visiting the [employer] [address] website at www.[facility].gov/vaccinecompensation. There is a time limit to file a claim for compensation. How can I learn more? · Ask your doctor. He or she can give you the vaccine package insert or suggest other sources of information. · Call your local or state health department. · Contact the Centers for Disease Control and Prevention (CDC):  ¨ Call 6-487.658.4002 (1-800-CDC-INFO) or  ¨ Visit CDC's website at www.cdc.gov/vaccines  Vaccine Information Statement (Interim)  Tdap Vaccine  (2/24/15)  [address]70  [facility] for Disease Control and Prevention  Many Vaccine Information Statements are available in Danish and other languages. See www.immunize.org/vis. Muchas hojas de información sobre vacunas están disponibles en español y en otros idiomas. Visite www.immunize.org/vis. Care instructions adapted under license by [employer] (which disclaims liability or warranty for this information). If you have questions about a medical condition or this instruction, always ask your healthcare professional. [address] any warranty or liability for your use of this information.

## 2017-07-11 NOTE — MR AVS SNAPSHOT
Visit Information     Date & Time Provider Department Dept. Phone Encounter #    7/11/2017 11:00 AM Jordan Bernabe PA-C [address] [phone] [id number]      Follow-up Instructions     Return in about 2 days (around 7/13/2017) for PPD reading- nurse visit.       Upcoming Health Maintenance        Date Due    HPV AGE 9Y-34Y (1 of 3 - Female 3 Dose Series) 4/2/2008    DTaP/Tdap/Td series (3 - Td) 1/8/2009    INFLUENZA AGE 9 TO ADULT 8/1/2017    Hepatitis A Peds Age 1-18 (2 of 3 - Twinrix 3-Dose Series) 8/8/2017      Allergies as of 7/11/2017  Review Complete On: 7/11/2017 By: Jordan Bernabe PA-C    No Known Allergies      Current Immunizations  Never Reviewed    Name Date    DTaP 5/7/2002, 7/8/1998, 1997, 1997, 1997    Hep B Vaccine 1997, 1997, 1997    Hib 7/8/1998, 1997, 1997, 1997    MMR 5/7/2002, 7/8/1998    Meningococcal (MCV4P) Vaccine 6/3/2015    Poliovirus vaccine 5/7/2002, 1997, 1997, 1997    TB Skin Test (PPD) Intradermal  Incomplete    Tdap  Incomplete, 7/8/2008    Varicella Virus Vaccine 7/8/1998       Not reviewed this visit   You Were Diagnosed With        Codes Comments    Need for Tdap vaccination    -  Primary ICD-10-CM: Z23  ICD-9-CM: V06.1     Screening-pulmonary TB     ICD-10-CM: Z11.1  ICD-9-CM: V74.1       Vitals     BP Pulse Temp Resp Height(growth percentile) Weight(growth percentile)    105/67 (BP 1 Location: Right arm, BP Patient Position: Sitting) 86 97.7 °F (36.5 °C) (Oral) 18 5' 8\" (1.727 m) 147 lb 9.6 oz (67 kg)    LMP SpO2 BMI OB Status Smoking Status       06/05/2017 96% 22.44 kg/m2 Chemically Induced Never Smoker     Vitals History      BMI and BSA Data     Body Mass Index Body Surface Area    22.44 kg/m 2 1.79 m 2         Preferred Pharmacy       Pharmacy Name Phone    [facility] [id number] [facility], [address] [place] [phone]         Your Updated Medication List          This list is accurate as of: 17 12:11 PM.  Always use your most recent med list.                loperamide 2 mg tablet   Commonly known as:  IMMODIUM   Take 2 mg by mouth as needed for Diarrhea. NexIUM 20 mg capsule   Generic drug:  esomeprazole   Take  by mouth daily. PROBIOTIC 4X 10-15 mg Tbec   Generic drug:  B.infantis-B.ani-B.long-B.bifi   Take  by mouth. ZIANA 1.2-0.025 % topical gel   Generic drug:  clindamycin-tretinoin   Apply  to affected area nightly. apply pea-sized amount TOPICALLY to entire face at bedtime; dot onto chin, cheeks, nose, and forehead then gently rub over entire face. We Performed the Following     AMB POC TUBERCULOSIS, INTRADERMAL (SKIN TEST) [60098 CPT(R)]     TETANUS, DIPHTHERIA TOXOIDS AND ACELLULAR PERTUSSIS VACCINE (TDAP), IN INDIVIDS. >=7, IM [17781 CPT(R)]       Follow-up Instructions     Return in about 2 days (around 2017) for PPD reading- nurse visit. Patient Instructions         Tdap (Tetanus, Diphtheria, Pertussis) Vaccine: What You Need to Know  Why get vaccinated? Tetanus, diphtheria, and pertussis are very serious diseases. Tdap vaccine can protect us from these diseases. And Tdap vaccine given to pregnant women can protect  babies against pertussis. Tetanus (lockjaw) is rare in the [facility] today. It causes painful muscle tightening and stiffness, usually all over the body. · It can lead to tightening of muscles in the head and neck so you can't open your mouth, swallow, or sometimes even breathe. Tetanus kills about 1 out of 10 people who are infected even after receiving the best medical care. Diphtheria is also rare in the [place] today. It can cause a thick coating to form in the back of the throat. · It can lead to breathing problems, heart failure, paralysis, and death. Pertussis (whooping cough) causes severe coughing spells, which can cause difficulty breathing, vomiting, and disturbed sleep.   · It can also lead to weight loss, incontinence, and rib fractures. Up to 2 in 100 adolescents and 5 in 100 adults with pertussis are hospitalized or have complications, which could include pneumonia or death. These diseases are caused by bacteria. Diphtheria and pertussis are spread from person to person through secretions from coughing or sneezing. Tetanus enters the body through cuts, scratches, or wounds. Before vaccines, as many as 200,000 cases of diphtheria, 200,000 cases of pertussis, and hundreds of cases of tetanus were reported in the [place] each year. Since vaccination began, reports of cases for tetanus and diphtheria have dropped by about 99% and for pertussis by about 80%. Tdap vaccine  The Tdap vaccine can protect adolescents and adults from tetanus, diphtheria, and pertussis. One dose of Tdap is routinely given at age 6 or 15. People who did not get Tdap at that age should get it as soon as possible. Tdap is especially important for health care professionals and anyone having close contact with a baby younger than 12 months. Pregnant women should get a dose of Tdap during every pregnancy, to protect the  from pertussis. Infants are most at risk for severe, life-threatening complications from pertussis. Another vaccine, called Td, protects against tetanus and diphtheria, but not pertussis. A Td booster should be given every 10 years. Tdap may be given as one of these boosters if you have never gotten Tdap before. Tdap may also be given after a severe cut or burn to prevent tetanus infection. Your doctor or the person giving you the vaccine can give you more information. Tdap may safely be given at the same time as other vaccines.   Some people should not get this vaccine  · A person who has ever had a life-threatening allergic reaction after a previous dose of any diphtheria-, tetanus-, or pertussis-containing vaccine, OR has a severe allergy to any part of this vaccine, should not get Tdap vaccine. Tell the person giving the vaccine about any severe allergies. · Anyone who had coma or long repeated seizures within 7 days after a childhood dose of DTP or DTaP, or a previous dose of Tdap, should not get Tdap, unless a cause other than the vaccine was found. They can still get Td. · Talk to your doctor if you:  ¨ Have seizures or another nervous system problem. ¨ Had severe pain or swelling after any vaccine containing diphtheria, tetanus, or pertussis. ¨ Ever had a condition called Guillain-Barré Syndrome (GBS). ¨ Aren't feeling well on the day the shot is scheduled. Risks  With any medicine, including vaccines, there is a chance of side effects. These are usually mild and go away on their own. Serious reactions are also possible but are rare. Most people who get Tdap vaccine do not have any problems with it.   Mild problems following Tdap  (Did not interfere with activities)  · Pain where the shot was given (about 3 in 4 adolescents or 2 in 3 adults)  · Redness or swelling where the shot was given (about 1 person in 5)  · Mild fever of at least 100.4°F (up to about 1 in 25 adolescents or 1 in 100 adults)  · Headache (about 3 or 4 people in 10)  · Tiredness (about 1 person in 3 or 4)  · Nausea, vomiting, diarrhea, stomachache (up to 1 in 4 adolescents or 1 in 10 adults)  · Chills, sore joints (about 1 person in 10)  · Body aches (about 1 person in 3 or 4)  · Rash, swollen glands (uncommon)  Moderate problems following Tdap  (Interfered with activities, but did not require medical attention)  · Pain where the shot was given (up to 1 in 5 or 6)  · Redness or swelling where the shot was given (up to about 1 in 16 adolescents or 1 in 12 adults)  · Fever over 102°F (about 1 in 100 adolescents or 1 in 250 adults)  · Headache (about 1 in 7 adolescents or 1 in 10 adults)  · Nausea, vomiting, diarrhea, stomachache (up to 1 to 3 people in 100)  · Swelling of the entire arm where the shot was given (up to about 1 in 500)  Severe problems following Tdap  (Unable to perform usual activities; required medical attention)  · Swelling, severe pain, bleeding and redness in the arm where the shot was given (rare)  Problems that could happen after any vaccine:  · People sometimes faint after a medical procedure, including vaccination. Sitting or lying down for about 15 minutes can help prevent fainting, and injuries caused by a fall. Tell your doctor if you feel dizzy or have vision changes or ringing in the ears. · Some people get severe pain in the shoulder and have difficulty moving the arm where a shot was given. This happens very rarely. · Any medication can cause a severe allergic reaction. Such reactions from a vaccine are very rare, estimated at fewer than 1 in a million doses, and would happen within a few minutes to a few hours after the vaccination. As with any medicine, there is a very remote chance of a vaccine causing a serious injury or death. The safety of vaccines is always being monitored. For more information, visit: www.cdc.gov/vaccinesafety. What if there is a serious problem? What should I look for? · Look for anything that concerns you, such as signs of a severe allergic reaction, very high fever, or unusual behavior. Signs of a severe allergic reaction can include hives, swelling of the face and throat, difficulty breathing, a fast heartbeat, dizziness, and weakness. These would usually start a few minutes to a few hours after the vaccination. What should I do? · If you think it is a severe allergic reaction or other emergency that can't wait, call 9-1-1 or get the person to the nearest hospital. Otherwise, call your doctor. · Afterward, the reaction should be reported to the Vaccine Adverse Event Reporting System (VAERS). Your doctor might file this report, or you can do it yourself through the VAERS web site at www.vaers. hhs.gov, or by calling 3-642.263.4818.   [employer] does not give medical advice. The National Vaccine Injury Compensation Program  The National Vaccine Injury Compensation Program (VICP) is a federal program that was created to compensate people who may have been injured by certain vaccines. Persons who believe they may have been injured by a vaccine can learn about the program and about filing a claim by calling 6-898.356.5823 or visiting the [employer] website at www.[facility].gov/vaccinecompensation. There is a time limit to file a claim for compensation. How can I learn more? · Ask your doctor. He or she can give you the vaccine package insert or suggest other sources of information. · Call your local or state health department. · Contact the Centers for Disease Control and Prevention (CDC):  ¨ Call 4-169.896.8244 (1-800-CDC-INFO) or  ¨ Visit CDC's website at www.cdc.gov/vaccines  Vaccine Information Statement (Interim)  Tdap Vaccine  (2/24/15)  [address] [id number]-22  Department of Health and Human Services  Centers for Disease Control and Prevention  Many Vaccine Information Statements are available in [place]ian and other languages. See www.immunize.org/vis. Muchas hojas de información sobre vacunas están disponibles en español y en otros idiomas. Visite www.immunize.org/vis. Care instructions adapted under license by [employer] (which disclaims liability or warranty for this information). If you have questions about a medical condition or this instruction, always ask your healthcare professional. [place] [address] any warranty or liability for your use of this information. Introducing [facility]! Farhad Spicer introduces [employer] patient portal. Now you can access parts of your medical record, email your doctor's office, and request medication refills online. 1. In your internet browser, go to [email or web address]   2. Click on the First Time User? Click Here link in the Sign In box.  You will see the New Member Sign Up page.  3. Enter your [employer] Access Code exactly as it appears below. You will not need to use this code after youve completed the sign-up process. If you do not sign up before the expiration date, you must request a new code. · [employer] Access Code: [email or web address]  Expires: 10/9/2017 12:11 PM    4. Enter the last four digits of your Social Security Number (xxxx) and Date of Birth (mm/dd/yyyy) as indicated and click Submit. You will be taken to the next sign-up page. 5. Create a [employer] ID. This will be your [employer] login ID and cannot be changed, so think of one that is secure and easy to remember. 6. Create a [employer] password. You can change your password at any time. 7. Enter your Password Reset Question and Answer. This can be used at a later time if you forget your password. 8. Enter your e-mail address. You will receive e-mail notification when new information is available in [address]. 9. Click Sign Up. You can now view and download portions of your medical record. 10. Click the Download Summary menu link to download a portable copy of your medical information. If you have questions, please visit the Frequently Asked Questions section of the [employer] website. Remember, [employer] is NOT to be used for urgent needs. For medical emergencies, dial 911. Now available from your iPhone and Android! Please provide this summary of care documentation to your next provider. Your primary care clinician is listed as Nola Stallings. If you have any questions after today's visit, please call [phone].

## 2017-07-13 ENCOUNTER — CLINICAL SUPPORT (OUTPATIENT)
Dept: FAMILY MEDICINE CLINIC | Age: 22
End: 2017-07-13

## 2017-07-13 DIAGNOSIS — Z11.1 SCREENING-PULMONARY TB: Primary | ICD-10-CM

## 2017-07-13 LAB
MM INDURATION POC: 0 MM (ref 0–5)
PPD POC: NEGATIVE NEGATIVE

## 2017-07-13 NOTE — PROGRESS NOTES
Callum Kathleen is a 23 y.o. female here today to have her PPD read. Test was negative with 0 mm of induration.

## 2019-10-09 ENCOUNTER — HOSPITAL ENCOUNTER (OUTPATIENT)
Dept: LAB | Age: 24
Discharge: HOME | End: 2019-10-09
Payer: COMMERCIAL

## 2019-10-09 ENCOUNTER — OFFICE VISIT (OUTPATIENT)
Dept: FAMILY MEDICINE CLINIC | Age: 24
End: 2019-10-09

## 2019-10-09 VITALS
DIASTOLIC BLOOD PRESSURE: 77 MMHG | TEMPERATURE: 98.1 F | HEART RATE: 99 BPM | WEIGHT: 170 LBS | OXYGEN SATURATION: 97 % | HEIGHT: 68 IN | BODY MASS INDEX: 25.76 KG/M2 | SYSTOLIC BLOOD PRESSURE: 123 MMHG | RESPIRATION RATE: 18 BRPM

## 2019-10-09 DIAGNOSIS — Z01.419 WELL WOMAN EXAM: Primary | ICD-10-CM

## 2019-10-09 DIAGNOSIS — Z11.3 SCREENING EXAMINATION FOR STD (SEXUALLY TRANSMITTED DISEASE): ICD-10-CM

## 2019-10-09 DIAGNOSIS — Z12.4 SCREENING FOR CERVICAL CANCER: ICD-10-CM

## 2019-10-09 DIAGNOSIS — Z01.419 WELL WOMAN EXAM: ICD-10-CM

## 2019-10-09 PROCEDURE — 87624 HPV HI-RISK TYP POOLED RSLT: CPT

## 2019-10-09 PROCEDURE — 87661 TRICHOMONAS VAGINALIS AMPLIF: CPT

## 2019-10-09 PROCEDURE — 86780 TREPONEMA PALLIDUM: CPT

## 2019-10-09 PROCEDURE — 87389 HIV-1 AG W/HIV-1&-2 AB AG IA: CPT

## 2019-10-09 PROCEDURE — 87625 HPV TYPES 16 & 18 ONLY: CPT

## 2019-10-09 PROCEDURE — 88175 CYTOPATH C/V AUTO FLUID REDO: CPT

## 2019-10-09 NOTE — PROGRESS NOTES
Subjective:   27 y.o. female for Well Woman Check. She is joining [employer] and needs to have her PAP and STD screening up to date. No LMP recorded. (Menstrual status: Chemically Induced). Pertinent past medical hstory: none. Patient Active Problem List   Diagnosis Code    Kidney stone N20.0    UTI (lower urinary tract infection) N39.0     Patient Active Problem List    Diagnosis Date Noted    Kidney stone     UTI (lower urinary tract infection)        No Known Allergies  Past Medical History:   Diagnosis Date    Chronic UTI     Kidney stone     Kidney stones     UTI (lower urinary tract infection)      Past Surgical History:   Procedure Laterality Date    HX WISDOM TEETH EXTRACTION       Family History   Problem Relation Age of Onset    Diabetes Mother     Hypertension Father     Migraines Father     Arthritis-osteo Paternal Grandmother     Cancer Paternal Grandmother     Heart Disease Paternal Grandmother     Lung Disease Paternal Grandmother     Stroke Paternal Grandmother     Hypertension Other      Social History     Tobacco Use    Smoking status: Never Smoker    Smokeless tobacco: Never Used   Substance Use Topics    Alcohol use: Yes     Alcohol/week: 2.0 standard drinks     Types: 2 Glasses of wine per week        ROS:  Feeling well. No dyspnea or chest pain on exertion. No abdominal pain, change in bowel habits, black or bloody stools. No urinary tract symptoms. GYN ROS: no breast pain or new or enlarging lumps on self exam, no discharge or pelvic pain, she complains of abnormal menstrual cycles- managed by GYN. No neurological complaints. Objective:     Visit Vitals  /77 (BP 1 Location: Left arm, BP Patient Position: Sitting)   Pulse 99   Temp 98.1 °F (36.7 °C) (Oral)   Resp 18   Ht 5' 8\" (1.727 m)   Wt 170 lb (77.1 kg)   SpO2 97%   BMI 25.85 kg/m²     The patient appears well, alert, oriented x 3, in no distress. Neck supple. No adenopathy or thyromegaly.  Lungs are clear, good air entry, no wheezes, rhonchi or rales. S1 and S2 normal, no murmurs, regular rate and rhythm. Abdomen soft without tenderness, guarding, mass or organomegaly. Extremities show no edema, normal peripheral pulses. Neurological is normal, no focal findings. Mood and affect are normal.     BREAST EXAM: breasts appear normal, no suspicious masses, no skin or nipple changes or axillary nodes    PELVIC EXAM: normal external genitalia, vulva, vagina, cervix, uterus and adnexa, PAP: Pap smear done today, exam chaperoned by MERLY Boucher    Assessment/Plan:       ICD-10-CM ICD-9-CM    1. Well woman exam Z01.419 V72.31 HIV 1/2 AG/AB, 4TH GENERATION,W RFLX CONFIRM      T PALLIDUM AB      PAP IG, CT-NG-TV, APTIMA HPV AND RFX 33/46,56([id number])   2. Screening for cervical cancer Z12.4 V76.2 PAP IG, CT-NG-TV, APTIMA HPV AND RFX 50/53,33([id number])   3. Screening examination for STD (sexually transmitted disease) Z11.3 V74.5 HIV 1/2 AG/AB, 4TH GENERATION,W RFLX CONFIRM      T PALLIDUM AB      PAP smear and STD screening done today. Will call with results  Reviewed how to perform monthly self breast exams. All questions and concerns addressed at today's visit. Follow-up and Dispositions    · Return in about 1 year (around 10/9/2020) for Well Woman.        Coral Arreola PA-C  10/09/19

## 2019-10-09 NOTE — PATIENT INSTRUCTIONS

## 2019-10-10 LAB
C TRACH RRNA SPEC QL NAA+PROBE: NEGATIVE
HIV 1+2 AB+HIV1 P24 AG SERPL QL IA: NONREACTIVE
HIV12 RESULT COMMENT, HHIVC: NORMAL
N GONORRHOEA RRNA SPEC QL NAA+PROBE: NEGATIVE
SPECIMEN SOURCE: NORMAL
T PALLIDUM AB SER QL IA: NONREACTIVE
T VAGINALIS RRNA SPEC QL NAA+PROBE: NEGATIVE

## 2019-10-28 ENCOUNTER — TELEPHONE (OUTPATIENT)
Dept: FAMILY MEDICINE CLINIC | Age: 24
End: 2019-10-28

## 2019-10-28 NOTE — TELEPHONE ENCOUNTER
Patient called for pap results so that it can be turned in to the [employer]. Patient is requesting a call back when resulted.

## 2019-10-29 NOTE — PROGRESS NOTES
PAP negative for malignancy but HPV positive- she needs repeat repeat PAP in one year.    Shows BV- she was not having any symptoms so this should clear up on its own